# Patient Record
Sex: FEMALE | Race: WHITE | NOT HISPANIC OR LATINO | Employment: OTHER | URBAN - METROPOLITAN AREA
[De-identification: names, ages, dates, MRNs, and addresses within clinical notes are randomized per-mention and may not be internally consistent; named-entity substitution may affect disease eponyms.]

---

## 2024-06-16 ENCOUNTER — HOSPITAL ENCOUNTER (EMERGENCY)
Facility: MEDICAL CENTER | Age: 54
End: 2024-06-17
Attending: EMERGENCY MEDICINE
Payer: MEDICARE

## 2024-06-16 DIAGNOSIS — M25.552 LEFT HIP PAIN: ICD-10-CM

## 2024-06-16 DIAGNOSIS — R45.851 SUICIDAL IDEATION: ICD-10-CM

## 2024-06-16 DIAGNOSIS — Z59.00 HOMELESSNESS: ICD-10-CM

## 2024-06-16 DIAGNOSIS — F19.90 DRUG USE: ICD-10-CM

## 2024-06-16 LAB — POC BREATHALIZER: 0 PERCENT (ref 0–0.01)

## 2024-06-16 PROCEDURE — 302970 POC BREATHALIZER

## 2024-06-16 PROCEDURE — 99285 EMERGENCY DEPT VISIT HI MDM: CPT

## 2024-06-16 SDOH — ECONOMIC STABILITY - HOUSING INSECURITY: HOMELESSNESS UNSPECIFIED: Z59.00

## 2024-06-17 ENCOUNTER — APPOINTMENT (OUTPATIENT)
Dept: RADIOLOGY | Facility: MEDICAL CENTER | Age: 54
End: 2024-06-17
Attending: EMERGENCY MEDICINE
Payer: MEDICARE

## 2024-06-17 VITALS
HEIGHT: 68 IN | TEMPERATURE: 97.2 F | SYSTOLIC BLOOD PRESSURE: 149 MMHG | DIASTOLIC BLOOD PRESSURE: 88 MMHG | BODY MASS INDEX: 27.93 KG/M2 | RESPIRATION RATE: 19 BRPM | HEART RATE: 94 BPM | OXYGEN SATURATION: 96 % | WEIGHT: 184.3 LBS

## 2024-06-17 PROCEDURE — 700102 HCHG RX REV CODE 250 W/ 637 OVERRIDE(OP): Performed by: EMERGENCY MEDICINE

## 2024-06-17 PROCEDURE — 90791 PSYCH DIAGNOSTIC EVALUATION: CPT

## 2024-06-17 PROCEDURE — A9270 NON-COVERED ITEM OR SERVICE: HCPCS | Performed by: EMERGENCY MEDICINE

## 2024-06-17 PROCEDURE — 73502 X-RAY EXAM HIP UNI 2-3 VIEWS: CPT | Mod: LT

## 2024-06-17 RX ORDER — ACETAMINOPHEN 500 MG
1000 TABLET ORAL ONCE
Status: COMPLETED | OUTPATIENT
Start: 2024-06-17 | End: 2024-06-17

## 2024-06-17 RX ADMIN — ACETAMINOPHEN 1000 MG: 500 TABLET, FILM COATED ORAL at 07:05

## 2024-06-17 NOTE — ED NOTES
Pt taken back to [place] [address] via wheelchair by this RN. Report given to Primary Nurse, Warren BRAVO. Pt in view of RN station and 1:1 sitter.

## 2024-06-17 NOTE — ED TRIAGE NOTES
Chief Complaint   Patient presents with    Hip Pain     Chronic left hip pain that is worse since pt fell onto left side 3 weeks ago; pt seen at [employer] after fall    Suicidal Ideation     Pt endorses SI s/t the pain with plan to OD on Fentanyl     Pt brought in by EMS and to triage via wheelchair for above complaint. Pt states that she just moved here from [place] in Feb and has not gotten established with a PCM. Pt states that she was previously with a pain management clinic in [place] for chronic pain to her hip and back but has been unable to get in with clinic here in [place]. Pt denies hx of suicide attempts in the past but states that she feels suicidal d/t this pain and being homeless. Pt does endorse smoking marijuana and meth to deal with these problems. Last meth use earlier today but pt is calm and cooperative in triage.     Pt is alert/oriented and follows commands. Pt speaking in full sentences and responds appropriately to questions. No acute distress noted in triage and respirations are even and unlabored.     Charge RN notified of pt and room requested.

## 2024-06-17 NOTE — ED NOTES
Checked on bed, with unlabored respirations. No safety risk noted  Sleeping  Continued safety precaution  Gurney in low position, side rail up for pt safety.   No needs identified at the moment

## 2024-06-17 NOTE — ED NOTES
Given PO fluids and snacks    Checked on bed, with unlabored respirations. No safety risk noted  Awake on bed  Continued safety precaution  Gurney in low position, side rail up for pt safety.   No needs identified at the moment

## 2024-06-17 NOTE — ED NOTES
Information was offered by Alert Team that this pt is going to [facility] later at around 0715 - 0730 AM,  and taxi voucher with bus pass at pt's chart.

## 2024-06-17 NOTE — ED NOTES
Bedside report received from previous shift.   Assumed patient care. Verified patient identification.  Checked on bed, with unlabored respirations. Discussed plan of care.   Vital signs is stable. Denied any new complaints.   Gurney in low position, side rail up for pt safety.  No needs identified at the moment.    Contraptions: None  Alert and Oriented: x 4  Ambulatory: Yes  Oxygen: None  Pending: UA/ on legal hold (not yet certified by ERP) - pending Alert Team evaluation

## 2024-06-17 NOTE — DISCHARGE PLANNING
[facility]  Crisis/Safety Plan    Name:  Sushma Farmer  MRN:  [id number]  Date:  2024    Warning signs that a crisis may be developing for me or I may be at risk:  1) tearful  2) fidgeting    Coping strategies I can use on my own (relaxation, physical activity, etc):  1) playing video games on my phone  2) deep breathing    Ways I can make my environment safe:  1) decrease drug use  2) secure sharps  3) no access to firearms    Things I want to tell myself when I feel a crisis developin) validate feelings  2) just breath      People I can contact for support or distraction (and their phone numbers):  1) boyfriend  2) son    If I’m not able to reach my support people, or the above strategies don’t help, I can contact the following professionals, agencies, or hotlines:  1) Crisis Call Center ():  [phone] -OR- [phone]  2) Crisis Text Line ():  Text CARE TO 446281       Diana Mcnally R.N.

## 2024-06-17 NOTE — ED NOTES
Pt ambulatory w/ steady gait. Pt assisted to ER lobby by staff. Cab called for pt to be transported to [facility]. Pt reminded to call for PCP for chronic issues. Pt verbalizes understanding for all instructions.

## 2024-06-17 NOTE — ED PROVIDER NOTES
"                                                        ED Provider Note    CHIEF COMPLAINT  Chief Complaint   Patient presents with    Hip Pain     Chronic left hip pain that is worse since pt fell onto left side 3 weeks ago; pt seen at [place] after fall    Suicidal Ideation     Pt endorses SI s/t the pain with plan to OD on Fentanyl        HPI    Primary care provider: No primary care provider on file.   History obtained from: Patient  History limited by: None     Sushma Farmer is a 54 y.o. female who presents to the ED by EMS complaining of suicidal ideation due to left hip pain.  Patient reports that she fell 3 weeks ago and hit her left hip on a \"big rock\" and has had pain since then.  She states that she was seen at [facility] \"last week\" and they did x-rays and \"they were mad at me.\"  She continues to have left hip pain which is making her want to kill herself.  She states that she plans to overdose on fentanyl.  She states that she does not have fentanyl \"but I know where to get it.\"  She denies any acts of self-harm.  She denies past suicide attempts.  She denies homicidal ideation.  She states that she does smoke fentanyl and methamphetamine at times and denies previous IV drug use.  She denies alcohol use.  She states that she came up from [place] in February because she  her  of 30 years.  She states that she is currently homeless although she has a son in the area.  She is requesting Seroquel to help her sleep tonight.  She also would like some food.  She otherwise has no further complaints.  She denies fever/recent illness/cough/shortness of breath or difficulty breathing/nausea/vomiting/diarrhea/dysuria.    REVIEW OF SYSTEMS  Please see HPI for pertinent positives/negatives.  All other systems reviewed and are negative.     PAST MEDICAL HISTORY  Past Medical History:   Diagnosis Date    Anxiety     Chronic pain     Depression     Diabetes (HCC)     Hypertension         SURGICAL " "HISTORY  Past Surgical History:   Procedure Laterality Date    HYSTERECTOMY LAPAROSCOPY      OTHER      stimulator placed in back    OTHER ORTHOPEDIC SURGERY Bilateral     knee replacements        SOCIAL HISTORY  Social History     Tobacco Use    Smoking status: Former     Types: Cigarettes    Smokeless tobacco: Never   Vaping Use    Vaping status: Every Day    Substances: THC   Substance and Sexual Activity    Alcohol use: Yes     Comment: \"couple times a year\"    Drug use: Yes     Types: Inhaled     Comment: marijuana and meth    Sexual activity: Not on file        FAMILY HISTORY  History reviewed. No pertinent family history.     CURRENT MEDICATIONS  Home Medications       Reviewed by Mera Go R.N. (Registered Nurse) on 06/16/24 at 2320  Med List Status: Not Addressed     Medication Last Dose Status        Patient Darshan Taking any Medications                            ALLERGIES  Allergies   Allergen Reactions    Zantac [Ranitidine] Anaphylaxis        PHYSICAL EXAM  VITAL SIGNS: BP (!) 149/88   Pulse 94   Temp 36.2 °C (97.2 °F) (Temporal)   Resp 19   Ht 1.727 m (5' 8\")   Wt 83.6 kg (184 lb 4.9 oz)   SpO2 96%   BMI 28.02 kg/m²  @GÓMEZ[[id number]::@     Pulse ox interpretation: 96% I interpret this pulse ox as normal     Constitutional: Well developed, well nourished, alert in no apparent distress, nontoxic appearance    HENT: No external signs of trauma, normocephalic, oropharynx moist and clear   Eyes: PERRL, EOMI, conjunctiva without erythema, no discharge, no icterus    Neck: Soft and supple, trachea midline, no stridor, no tenderness, no LAD, good ROM    Cardiovascular: Regular rate and rhythm, no murmurs/rubs/gallops, strong distal pulses and good perfusion    Thorax & Lungs: No respiratory distress, CTAB    Abdomen: Soft, nontender, nondistended, no guarding, no rebound, normal BS    Back: No CVAT     Extremities: No cyanosis, mild tenderness left hip laterally, no edema/bruising/crepitus, no gross " deformity, good ROM, intact distal pulses with brisk cap refill    Skin: Warm, dry, no pallor/cyanosis, no rash noted      Neuro: A/O times 3, no focal deficits noted    Psychiatric: Cooperative, normal mood and affect, no signs of hallucinations or delusions, reports suicidal ideation with plan, denies homicidal ideation      DIAGNOSTIC STUDIES / PROCEDURES        LABS  All labs reviewed by me.     Results for orders placed or performed during the hospital encounter of 06/16/24   POC BREATHALIZER   Result Value Ref Range    POC Breathalizer 0.00 0.00 - 0.01 Percent        RADIOLOGY  I have independently interpreted the diagnostic imaging associated with this visit and am waiting the final reading from the radiologist.   My preliminary interpretation is as follows: No displaced fracture/dislocation.    DX-HIP-COMPLETE - UNILATERAL 2+ LEFT   Final Result         1.  No radiographic evidence of acute traumatic injury.             COURSE & MEDICAL DECISION MAKING  Nursing notes, VS, PMSFHx reviewed in chart.     Review of past medical records shows the patient was seen at [facility] ED on June 14, 2024 and diagnosed with left thigh pain.  Patient was last seen in the office at [facility] on January 7, 2022.      Differential diagnoses considered include but are not limited to: Fx, contusion, strain, sprain, bursitis, suicidal ideation/attempt, anxiety, depression, personality disorder, intoxication      ED Observation Status? Yes; I am placing the patient in to an observation status due to a diagnostic uncertainty as well as therapeutic intensity. Patient placed in observation status at 11:52 PM, 6/16/2024.     Observation plan is as follows: We will check alcohol level and UDS and also obtain left hip x-ray and monitor patient in the ED.    Upon Reevaluation, the patient's condition has: Remained stable and will be discharged.    Patient discharged from ED Observation status at 0730 on June  17, 2024.       INITIAL ASSESSMENT AND PLAN  Care Narrative: This is a 54-year-old female patient with medical history including diabetes, hypertension, anxiety, depression who presents by EMS complaining of suicidal ideation with plan to overdose on fentanyl.  Patient reports suicidal ideation because of continued left hip pain after she fell and hit her hip on a rock 3 weeks ago.  She reports being seen at [facility] with negative x-ray but continues to have pain.  Will obtain x-rays of the left hip today and also check UDS and alcohol level and closely monitor patient in the ED while awaiting mental health evaluation.      Discussion of management with other QHP or appropriate source(s): Behavioral Health        Escalation of care considered, and ultimately not performed: acute inpatient care management, however at this time, the patient is most appropriate for outpatient management.     Barriers to care at this time, including but not limited to: Patient does not have established PCP and Patient is homeless.     Decision tools and prescription drugs considered including, but not limited to: Medication modification   .       History and physical exam as above.  Patient with negative alcohol level and admits to using meth and fentanyl as needed for her chronic pain.  Patient was evaluated by alert team and cleared for discharge to [facility] and outpatient follow-up.  Her suicidal ideation appears to be situational and due to combination of her chronic pain and her current unsheltered social situation.  X-rays of the left hip obtained in the ED without acute bony findings and patient was able to ambulate in the ED without difficulty.  I have low clinical suspicion at this time for occult fracture or septic joint.  Patient's exam is otherwise benign.  She is cooperative and without evidence for hallucinations or delusions.  She does not appear to be clinically intoxicated.  Patient was monitored in the ED and  remained clinically stable.  She is aware of plan for discharge to [facility] and is agreeable.  She was given outpatient resources and advised on outpatient follow-up.  She was noted to have incidental elevated blood pressure for which she can follow-up on outpatient basis for further management.  Return to ED precautions given.  She verbalized understanding and agreed with plan of care with no further questions or concerns.      The patient is referred to a primary physician for blood pressure management, diabetic screening, and for all other preventative health concerns.       FINAL IMPRESSION  1. Suicidal ideation Acute   2. Left hip pain Acute   3. Homelessness Active   4. Drug use Active          DISPOSITION  Patient will be [facility] in [facility] condition.       FOLLOW UP  [facility] - Primary Care and Family Medicine  [address] [address]  [phone]  Schedule an appointment as soon as possible for a visit       [facility] - Primary Care  [address] [facility] [address]  [phone]  Schedule an appointment as soon as possible for a visit       Please go to [facility] now as discussed with behavioral health          [facility], Emergency Dept  [address] [address] [phone]    If symptoms worsen         OUTPATIENT MEDICATIONS  There are no discharge medications for this patient.         Electronically signed by: Elvin Sesay D.O., 6/16/2024 11:52 PM      Portions of this record were made with voice recognition software.  Despite my review, errors may remain.  Please interpret this chart in the appropriate context.

## 2024-06-17 NOTE — CONSULTS
"[facility]   TRIAGE ASSESSMENT    Name: Sushma Farmer  MRN: [id number]  : 1970  Age: 54 y.o.  Date of assessment: 2024  PCP: Pcp Pt States None  Persons in attendance: Patient  Patient Location: [facility]    CHIEF COMPLAINT/PRESENTING ISSUE (as stated by Patient, ER RN, ANCELMO):   Chief Complaint   Patient presents with    Hip Pain     Chronic left hip pain that is worse since pt fell onto left side 3 weeks ago; pt seen at [facility] after fall    Suicidal Ideation     Pt endorses SI s/t the pain with plan to OD on Fentanyl      Patient is a 53 y/o female presenting to ER with complaints of increasing hip pain and passive suicidal ideation.   Patient initially voices plan to over dose on street fentanyl but denies intent or no access to any illicit drug other than methamphetamine. Patient admits to recent meth use, 2 hrs prior to ER arrival; \"I haven't slept for a week,\" d/t active substance use to stay awake when sleeping on the streets.   Patient denies any history of previous attempts or self-harming behaviors.  Patient seeking assistance connecting to services in the community since relocating from [facility] since February. Patient reports a psychiatric diagnose history of Depression, Insomnia  and Anxiety; reports previously prescribed Seroquel and hydroxyzine.     CURRENT LIVING SITUATION/SOCIAL SUPPORT/FINANCIAL RESOURCES: Patient un housed since relocating from [place] in February following divorce of  of 30 yrs. Patient reports moving to be near son in [place].     BEHAVIORAL HEALTH/SUBSTANCE USE TREATMENT HISTORY  Does patient/parent report a history of prior behavioral health/substance use treatment for patient?   Diagnose history or Anxiety  and Depression;   Previously prescribed Seroquel and Sertraline; off meds 3 1/2 months.  Previously established with [facility].     SAFETY ASSESSMENT - SELF  Does patient acknowledge current or past symptoms of " dangerousness to self or is previous history noted? yes  Does parent/significant other report patient has current or past symptoms of dangerousness to self? N\A  Does presenting problem suggest symptoms of dangerousness to self? No; Patient presents to ER with hip pain vocalizing increasing depression and passive suicidal ideation with plan to overdose on fentanyl; patient denies any intent and SI situational to her chronic and pain and homelessness. Patient denies any hx of previous attempts or self-harming behaviors. Patient denies current SI; forward thinking and goal oriented; patient able to contract for safety; patient is forward thinking and goal oriented.     SAFETY ASSESSMENT - OTHERS  Does patient acknowledge current or past symptoms of aggressive behavior or risk to others or is previous history noted? no  Does parent/significant other report patient has current or past symptoms of aggressive behavior or risk to others?  N\A  Does presenting problem suggest symptoms of dangerousness to others? No; denies HI; calm and cooperative with ER staff.     LEGAL HISTORY  Does patient acknowledge history of arrest/care home/[facility] or is previous history noted? no    Crisis Safety Plan completed and copy given to patient? yes    ABUSE/NEGLECT SCREENING  Does patient report feeling “unsafe” in his/her home, or afraid of anyone?  no  Does patient report any history of physical, sexual, or emotional abuse?  no  Does parent or significant other report any of the above? N\A  Is there evidence of neglect by self?  no  Is there evidence of neglect by a caregiver? no  Does the patient/parent report any history of CPS/APS/police involvement related to suspected abuse/neglect or domestic violence? no  Based on the information provided during the current assessment, is a mandated report of suspected abuse/neglect being made?  No    SUBSTANCE USE SCREENING  Yes:  Owen all substances used in the past 30 days:      Last Use Amount  "  []   Alcohol     []   Marijuana     []   Heroin     []   Prescription Opioids  (used without prescription, for    recreation, or in excess of prescribed amount)     []   Other Prescription  (used without prescription, for    recreation, or in excess of prescribed amount)     []   Cocaine      [x]   Methamphetamine 6/16/24 Unknown amount   []   \"\" drugs (ectasy, MDMA)     []   Other substances        UDS results: pending  Breathalyzer results: 0.00    What consequences does the patient associate with any of the above substance use and or addictive behaviors? Monetary problems: patient reports homeless and substance use d/t not wanting to sleep at night for fear of being attacked.    Risk factors for detox (check all that apply):  []  Seizures   []  Diaphoretic (sweating)   []  Tremors   []  Hallucinations   []  Increased blood pressure   []  Decreased blood pressure   []  Other   [x]  None      [x] Patient education on risk factors for detoxification and instructed to return to ER as needed.      MENTAL STATUS   Participation: Active verbal participation and Open to feedback  Grooming: Casual  Orientation: Fully Oriented and Drowsy/Somnolent  Behavior: Calm  Eye contact: Limited  Mood: Depressed  Affect: Flexible and Full range  Thought process: Logical and Goal-directed  Thought content: Within normal limits  Speech: Rate within normal limits and Volume within normal limits  Perception: Within normal limits  Memory:  No gross evidence of memory deficits  Insight: Adequate  Judgment:  Adequate  Other:    Collateral information:   Source:  [] Significant other present in person:   [] Significant other by telephone  [] [facility]   [x] [facility] Staff  [x] [facility]  [x] Other: ERP    [] Unable to complete full assessment due to:  [] Acute intoxication  [] Patient declined to participate/engage  [] Patient verbally unresponsive  [] Significant cognitive deficits  [] Significant " perceptual distortions or behavioral disorganization  [x] Other: N/A     CLINICAL IMPRESSIONS:  Primary:  Depression  Secondary:  Homelessness, Chronic Pain, Substance Use       IDENTIFIED NEEDS/PLAN:  [Trigger DISPOSITION list for any items marked]    []  Imminent safety risk - self [] Imminent safety risk - others   []  Acute substance withdrawal []  Psychosis/Impaired reality testing   [x]  Mood/anxiety [x]  Substance use/Addictive behavior   []  Maladaptive behaviro []  Parent/child conflict   []  Family/Couples conflict [x]  Biomedical   [x]  Housing [x]  Financial   []   Legal  Occupational/Educational   []  Domestic violence []  Other:     Recommended Plan of Care:  Refer to [facility], [facility], [facility] [place], and [facility], [facility]  Taxi Voucher # [phone] upon discharge.    Has the Recommended Plan of Care/Level of Observation been reviewed with the patient's assigned nurse? yes    Does patient/parent or guardian express agreement with the above plan? yes    Referral appointment(s) scheduled? N\A    Alert team only: Patient reports SI is situational to her current un housed status resulting in her recent methamphetamine use, patient also reports increasing depression d/t her uncontrolled, chronic hip pain. Patient able to contract for safety, requesting to rest with something to eat. Patient to discharge over to [facility] in the morning to connect to shelter and transitional housing.   I have discussed findings and recommendations with Dr. Sesay who is in agreement with these recommendations.     Referral information sent to the following outpatient community providers : [facility]    Referral information sent to the following inpatient community providers : N/A      Diana Mcnally R.N.  6/17/2024

## 2024-06-17 NOTE — ED NOTES
ANCELMO discontinued the legal hold as per discussion wit the Alert Team  Pt will be discharged in the morning

## 2024-06-21 ENCOUNTER — PHARMACY VISIT (OUTPATIENT)
Dept: PHARMACY | Facility: MEDICAL CENTER | Age: 54
End: 2024-06-21
Payer: COMMERCIAL

## 2024-06-21 ENCOUNTER — HOSPITAL ENCOUNTER (EMERGENCY)
Facility: MEDICAL CENTER | Age: 54
End: 2024-06-21
Attending: EMERGENCY MEDICINE
Payer: MEDICARE

## 2024-06-21 VITALS
OXYGEN SATURATION: 98 % | HEIGHT: 68 IN | WEIGHT: 177 LBS | HEART RATE: 98 BPM | SYSTOLIC BLOOD PRESSURE: 130 MMHG | RESPIRATION RATE: 14 BRPM | DIASTOLIC BLOOD PRESSURE: 80 MMHG | TEMPERATURE: 97.6 F | BODY MASS INDEX: 26.83 KG/M2

## 2024-06-21 DIAGNOSIS — F15.10 METHAMPHETAMINE ABUSE (HCC): ICD-10-CM

## 2024-06-21 DIAGNOSIS — F43.21 SITUATIONAL DEPRESSION: ICD-10-CM

## 2024-06-21 DIAGNOSIS — Z59.00 HOMELESSNESS: ICD-10-CM

## 2024-06-21 DIAGNOSIS — M54.32 SCIATICA OF LEFT SIDE: ICD-10-CM

## 2024-06-21 LAB — POC BREATHALIZER: 0 PERCENT (ref 0–0.01)

## 2024-06-21 PROCEDURE — 302970 POC BREATHALIZER

## 2024-06-21 PROCEDURE — 302970 POC BREATHALIZER: Performed by: EMERGENCY MEDICINE

## 2024-06-21 PROCEDURE — A9270 NON-COVERED ITEM OR SERVICE: HCPCS | Performed by: EMERGENCY MEDICINE

## 2024-06-21 PROCEDURE — RXMED WILLOW AMBULATORY MEDICATION CHARGE: Performed by: EMERGENCY MEDICINE

## 2024-06-21 PROCEDURE — 700102 HCHG RX REV CODE 250 W/ 637 OVERRIDE(OP): Performed by: EMERGENCY MEDICINE

## 2024-06-21 PROCEDURE — 99284 EMERGENCY DEPT VISIT MOD MDM: CPT

## 2024-06-21 RX ORDER — OXYCODONE AND ACETAMINOPHEN 10; 325 MG/1; MG/1
1 TABLET ORAL ONCE
Status: COMPLETED | OUTPATIENT
Start: 2024-06-21 | End: 2024-06-21

## 2024-06-21 RX ORDER — IBUPROFEN 600 MG/1
600 TABLET ORAL ONCE
Status: COMPLETED | OUTPATIENT
Start: 2024-06-21 | End: 2024-06-21

## 2024-06-21 RX ORDER — METHYLPREDNISOLONE 4 MG/1
TABLET ORAL
Qty: 21 TABLET | Refills: 0 | Status: SHIPPED | OUTPATIENT
Start: 2024-06-21

## 2024-06-21 RX ADMIN — OXYCODONE HYDROCHLORIDE AND ACETAMINOPHEN 1 TABLET: 10; 325 TABLET ORAL at 10:48

## 2024-06-21 RX ADMIN — IBUPROFEN 600 MG: 600 TABLET, FILM COATED ORAL at 10:48

## 2024-06-21 SDOH — ECONOMIC STABILITY - HOUSING INSECURITY: HOMELESSNESS UNSPECIFIED: Z59.00

## 2024-06-21 NOTE — ED NOTES
"Pt discharged by self via buspass. The pt is alert and oriented, calm and cooperative, talks with clear coherent speech, ambulates with a steady gait with a walker, and moves extremities. The pt reports a resolution of pain upon discharge. Vitals stable. pt in possession of belongings. Pt provided discharge education and information pertaining to medications and follow up appointments. Pt received copy of discharge instructions and verbalized understanding. /80   Pulse 98   Temp 36.4 °C (97.6 °F) (Temporal)   Resp 14   Ht 1.727 m (5' 8\")   Wt 80.3 kg (177 lb)   SpO2 98%   BMI 26.91 kg/m²     "

## 2024-06-21 NOTE — ED NOTES
Bedside report received from off going RN/tech: Tatiana, assumed care of patient.  POC discussed with patient. Call light within reach, all needs addressed at this time.       Fall risk interventions in place: Move the patient closer to the nurse's station, Patient's personal possessions are with in their safe reach, Place socks on patient, Place fall risk sign on patient's door, Keep floor surfaces clean and dry, and Accompanied to restroom (all applicable per [facility] Fall risk assessment)   Continuous monitoring: Not Applicable   IVF/IV medications: Not Applicable   Oxygen: Room Air  Bedside sitter: Not Applicable   Isolation: Not Applicable

## 2024-06-21 NOTE — PROGRESS NOTES
Cane fit to pt and left at bedside. Pt instructed on use and adjustments if necessary after dc. All relevant questions answered at this time.    Contact traction for any questions or concerns regarding this DME.

## 2024-06-21 NOTE — ED TRIAGE NOTES
Chief Complaint   Patient presents with    Suicidal Ideation     SHIRA CANTU from clinic off [address]. Patient was there for her sciatica pain. She states that the pain is making her feel suicidal. She has a plan to overdose on fentanyl. Patient states that if she was not in pain she would not want to hurt herself. Reports pain has been there for 4 weeks. Last used meth this am. Also states she is out of her bipolar/depression medication

## 2024-06-21 NOTE — ED NOTES
Pt finished meal, pt changing for discharge. The pt is alert and oriented. The pt denies pain while sitting

## 2024-06-21 NOTE — ED PROVIDER NOTES
ER Provider Note    Scribed for Paco Jenkins M.D. by Mirlande Cabral. 6/21/2024   10:32 AM    Primary Care Provider: None noted.    CHIEF COMPLAINT  Chief Complaint   Patient presents with    Suicidal Ideation     SHIRA CANTU from clinic off [address]. Patient was there for her sciatica pain. She states that the pain is making her feel suicidal. She has a plan to overdose on fentanyl. Patient states that if she was not in pain she would not want to hurt herself. Reports pain has been there for 4 weeks. Last used meth this am. Also states she is out of her bipolar/depression medication     EXTERNAL RECORDS REVIEWED  Patient was in the ED 5 days ago 6/16/24 for similar complaints of hip pain and suicidal ideation.     HPI/ROS  Sushma Farmer is a 54 y.o. female who presents to the ED for evaluation of chronic left sided sciatica, worsening pain today. Patient reports she had pain management in [place] for 13 years, where oxycodone or percocet controlled her pain. She reports that she has not taken her pain medications in a while. The patient states she is not suicidal if her pain is under control. She denies any bowel or bladder incontinence.    PAST MEDICAL HISTORY  Past Medical History:   Diagnosis Date    Anxiety     Chronic pain     Depression     Diabetes (HCC)     Hypertension        SURGICAL HISTORY  Past Surgical History:   Procedure Laterality Date    HYSTERECTOMY LAPAROSCOPY      OTHER      stimulator placed in back    OTHER ORTHOPEDIC SURGERY Bilateral     knee replacements       FAMILY HISTORY  History reviewed. No pertinent family history.    SOCIAL HISTORY   reports that she has quit smoking. Her smoking use included cigarettes. She has never used smokeless tobacco. She reports current alcohol use. She reports current drug use. Drug: Inhaled.    CURRENT MEDICATIONS  Previous Medications    No medications on file       ALLERGIES  Allergies   Allergen Reactions    Zantac [Ranitidine] Anaphylaxis        PHYSICAL  "EXAM  BP (!) 154/102   Pulse (!) 55   Temp 36.8 °C (98.2 °F)   Resp 16   Ht 1.727 m (5' 8\")   Wt 80.3 kg (177 lb)   SpO2 98%   BMI 26.91 kg/m²      Nursing note and vitals reviewed.  Constitutional: Well-developed and well-nourished. No distress.   HENT: Head is normocephalic and atraumatic. Oropharynx is clear and moist without exudate or erythema.   Eyes: Pupils are equal, round, and reactive to light. Conjunctiva are normal.   Cardiovascular: Normal rate and regular rhythm. No murmur heard. Normal radial pulses.  Pulmonary/Chest: Breath sounds normal. No wheezes or rales.   Abdominal: Soft and non-tender. No distention    Musculoskeletal: Extremities exhibit normal range of motion without edema or tenderness.   Neurological: Awake, alert and oriented to person, place, and time. No focal deficits noted.  Skin: Skin is warm and dry. No rash.   Psychiatric: Depressed mood. Flat affect. Situational attitude related to pain.    DIAGNOSTIC STUDIES    Labs:   Results for orders placed or performed during the hospital encounter of 06/21/24   POC BREATHALIZER   Result Value Ref Range    POC Breathalizer 0.000 0.00 - 0.01 Percent            INITIAL ASSESSMENT AND PLAN    10:32 AM - Patient was evaluated at bedside for left-sided sciatic pain. Patient feels that she would not be suicidal if pain was under control. Ordered for POC beathalizer, and Urine drug screen to evaluate. The patient will be medicated with Motrin 500 mg and Percocet  mg for her symptoms. Patient verbalizes understanding and support with my plan of care. I referred the patient to [facility] to have further treatment of their pain. She will be discharged with a Medrol dose pack.            DISPOSITION AND DISCUSSIONS    Escalation of care considered, and ultimately not performed: acute inpatient care management, however at this time, the patient is most appropriate for outpatient management.    Barriers to care at this time, " including but not limited to: Patient does not have established PCP.     Decision tools and prescription drugs considered including, but not limited to: Pain Medications. I considered prescribing narcotic pain medication, however I feel pain should be adequately controlled with over the counter NSAIDs.       DISPOSITION:  Patient will be discharged home in stable condition.    FOLLOW UP:  [facility], Emergency Dept  [address]  [place] [id number] [phone]    If symptoms worsen    [facility]  [address] [place] [id number]  [phone]    Today        OUTPATIENT MEDICATIONS:  New Prescriptions    METHYLPREDNISOLONE (MEDROL) 4 MG TAB    Take as per the package instructions.         FINAL DIAGNOSIS  1. Situational depression    2. Sciatica of left side    3. Methamphetamine abuse (HCC)    4. Homelessness         Mirlande CHANDLER (Scribe), am scribing for, and in the presence of, Paco Jenkins M.D..    Electronically signed by: Mirlande Cabral (Scribe), 6/21/2024    Paco CHANDLER M.D. personally performed the services described in this documentation, as scribed by Mirlande Cabral in my presence, and it is both accurate and complete.      The note accurately reflects work and decisions made by me.  Paco Jenkins M.D.  6/21/2024  1:11 PM

## 2024-08-16 ENCOUNTER — HOSPITAL ENCOUNTER (EMERGENCY)
Facility: MEDICAL CENTER | Age: 54
End: 2024-08-17
Attending: EMERGENCY MEDICINE
Payer: MEDICARE

## 2024-08-16 ENCOUNTER — APPOINTMENT (OUTPATIENT)
Dept: RADIOLOGY | Facility: MEDICAL CENTER | Age: 54
End: 2024-08-16
Attending: EMERGENCY MEDICINE
Payer: MEDICARE

## 2024-08-16 VITALS
HEART RATE: 102 BPM | SYSTOLIC BLOOD PRESSURE: 178 MMHG | WEIGHT: 178.79 LBS | DIASTOLIC BLOOD PRESSURE: 101 MMHG | TEMPERATURE: 97.9 F | HEIGHT: 68 IN | RESPIRATION RATE: 19 BRPM | OXYGEN SATURATION: 98 % | BODY MASS INDEX: 27.1 KG/M2

## 2024-08-16 DIAGNOSIS — S70.02XA TRAUMATIC HEMATOMA OF LEFT HIP, INITIAL ENCOUNTER: ICD-10-CM

## 2024-08-16 DIAGNOSIS — M25.552 LEFT HIP PAIN: ICD-10-CM

## 2024-08-16 DIAGNOSIS — R29.6 MULTIPLE FALLS: ICD-10-CM

## 2024-08-16 PROCEDURE — 73501 X-RAY EXAM HIP UNI 1 VIEW: CPT | Mod: LT

## 2024-08-16 PROCEDURE — 99284 EMERGENCY DEPT VISIT MOD MDM: CPT

## 2024-08-17 ENCOUNTER — PHARMACY VISIT (OUTPATIENT)
Dept: PHARMACY | Facility: MEDICAL CENTER | Age: 54
End: 2024-08-17
Payer: COMMERCIAL

## 2024-08-17 PROCEDURE — A9270 NON-COVERED ITEM OR SERVICE: HCPCS | Performed by: EMERGENCY MEDICINE

## 2024-08-17 PROCEDURE — RXMED WILLOW AMBULATORY MEDICATION CHARGE: Performed by: EMERGENCY MEDICINE

## 2024-08-17 PROCEDURE — 700102 HCHG RX REV CODE 250 W/ 637 OVERRIDE(OP): Performed by: EMERGENCY MEDICINE

## 2024-08-17 RX ORDER — HYDROCODONE BITARTRATE AND ACETAMINOPHEN 5; 325 MG/1; MG/1
1 TABLET ORAL EVERY 6 HOURS PRN
Qty: 8 TABLET | Refills: 0 | Status: SHIPPED | OUTPATIENT
Start: 2024-08-17 | End: 2024-08-19

## 2024-08-17 RX ORDER — OXYCODONE AND ACETAMINOPHEN 5; 325 MG/1; MG/1
1 TABLET ORAL ONCE
Status: COMPLETED | OUTPATIENT
Start: 2024-08-17 | End: 2024-08-17

## 2024-08-17 RX ADMIN — OXYCODONE HYDROCHLORIDE AND ACETAMINOPHEN 1 TABLET: 5; 325 TABLET ORAL at 01:04

## 2024-08-17 NOTE — ED TRIAGE NOTES
"Chief Complaint   Patient presents with    Hip Pain     Worsening left hip pain today. Pt reports pt fell on left side this morning. - head strike. Ps 10/10. Pt able to bear weight.        Pt ambulatory to triage. Pt A&Ox4, Pt reports she took robaxin and naproxen with no relief..     Pt to lobby . Pt educated on alerting staff in changes to condition. Pt verbalized understanding.      BP (!) 208/108   Pulse (!) 118   Temp 36.3 °C (97.3 °F) (Temporal)   Resp 20   Ht 1.727 m (5' 8\")   Wt 81.1 kg (178 lb 12.7 oz)   SpO2 98%   BMI 27.19 kg/m²    "

## 2024-08-17 NOTE — ED NOTES
"Bedside report received from off going RN/tech: Iam, assumed care of patient.  POC discussed with patient. Call light within reach, all needs addressed at this time.       Fall risk interventions in place: Not Applicable (all applicable per [place] Fall risk assessment)   Continuous monitoring: Not Applicable   IVF/IV medications: Not Applicable   Oxygen: Room Air  Bedside sitter: Not Applicable   Isolation: Not Applicable    BP (!) 178/101   Pulse (!) 102   Temp 36.6 °C (97.9 °F) (Temporal)   Resp 19   Ht 1.727 m (5' 8\")   Wt 81.1 kg (178 lb 12.7 oz)   SpO2 98%  - RA  "

## 2024-08-17 NOTE — DISCHARGE INSTRUCTIONS
Apply warm compresses to the hip  Take the medication and prescribing you as directed and follow-up with [facility].  Your x-ray was negative and the lump on your hip is due to a collection of blood from all of your falls.

## 2024-08-17 NOTE — ED NOTES
Patient provided with walker per request. Patient demonstrated understanding or adjusting height and walked independently with steady gait using walker. Discharge education provided. Discharge paperwork reviewed with pt. Prescription to be picked up by pt. All questions answered. All belongings with pt. Pt ambulated to lobby unassisted with steady gait using walker.

## 2024-08-17 NOTE — ED PROVIDER NOTES
ER Provider Note    Scribed for Dr. Nani Hensley D.O. by Cadence Patiño. 8/17/2024  12:19 AM    Primary Care Provider: Pcp Pt States None    CHIEF COMPLAINT  Chief Complaint   Patient presents with    Hip Pain     Worsening left hip pain today. Pt reports pt fell on left side this morning. - head strike. Ps 10/10. Pt able to bear weight.        EXTERNAL RECORDS REVIEWED  External ED Note Patient reports seeing [facility] 8/3/2024 for left hip pain and low back pain.    HPI/ROS  LIMITATION TO HISTORY   Select: : None    OUTSIDE HISTORIAN(S):  Friend present at bedside.    Sushma Farmer is a 54 y.o. female who presents to the ED for worsening left hip pain onset today. Patient reports that she is homeless currently. She reports falling eight times today onto her left hip. 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 She reports falling on her left side this morning. She denies hitting her head. She reports the pain as a 10/10 in severity. She reports being able to bear weight. She denies allergies to medicine. There are no known alleviating or exacerbating factors.  She reports also being seen at [facility] where she was prescribed Naprosyn and Robaxin.     PAST MEDICAL HISTORY  Past Medical History:   Diagnosis Date    Anxiety     Chronic pain     Depression     Diabetes (HCC)     Hypertension        SURGICAL HISTORY  Past Surgical History:   Procedure Laterality Date    HYSTERECTOMY LAPAROSCOPY      OTHER      stimulator placed in back    OTHER ORTHOPEDIC SURGERY Bilateral     knee replacements       FAMILY HISTORY  History reviewed. No pertinent family history.    SOCIAL HISTORY   reports that she has quit smoking. Her smoking use included cigarettes. She has never used smokeless tobacco. She reports current alcohol use. She reports current drug use. Drug: Inhaled.    CURRENT MEDICATIONS  Previous Medications    METHYLPREDNISOLONE (MEDROL) 4 MG TABLET THERAPY PACK     "Take as per the package instructions.       ALLERGIES  Zantac [ranitidine]    PHYSICAL EXAM  BP (!) 178/101   Pulse (!) 102   Temp 36.6 °C (97.9 °F) (Temporal)   Resp 19   Ht 1.727 m (5' 8\")   Wt 81.1 kg (178 lb 12.7 oz)   SpO2 98%   BMI 27.19 kg/m²   Constitutional: Patient is well developed, well nourished. Non-toxic appearing. Moderate distress, crying hysterically.  HENT: Normocephalic, atraumatic.  Nares are patent and clear, oral mucosa is moist.  Cardiovascular: Normal heart rate and Regular rhythm. No murmur,   Thorax & Lungs: Clear and equal breath sounds with good excursion. No respiratory distress   Abdomen: Bowel sounds normal in all four quadrants. Soft,nontender, no rebound , guarding, palpable masses.   Skin: Warm, Dry,   Left hip with 6 x 6 cm hematoma, with ecchymotic changes discoloration consistent with one week.  Extremities: Peripheral pulses 4/4 no peripheral edema.  Neurovascularly intact.  Neurologic: Alert & oriented x 3, Normal motor function, Normal sensory function,   Psychiatric: Affect odd, mood is tearful and agitated    DIAGNOSTIC STUDIES & PROCEDURES    Radiology:   The attending Emergency Physician has independently interpreted the diagnostic imaging associated with this visit and is awaiting the final reading from the radiologist, which will be displayed below.    Preliminary interpretation is a follows: No acute abnormalities    Radiologist interpretation:    DX-HIP-UNILATERAL-WITH PELVIS-1 VIEW LEFT   Final Result         1.  No radiographic evidence of acute traumatic injury.           COURSE & MEDICAL DECISION MAKING    ED Observation Status? No; Patient does not meet criteria for ED Observation.     INITIAL ASSESSMENT AND PLAN  Care Narrative:       11:07 PM - ordered DX-Hip for further evaluation.    12:19 AM - Patient seen and evaluated at bedside. Discussed plan of care, including discharge, because upon my exam and imaging results there is no evidence of " abnormality. Agreed on plan to medicate for pain prior to discharge. Patient agrees to plan of care. Patient will be treated with Percocet for her symptoms.     12:52 AM - The patient informs me their pain feels improved following medication administration. I discussed the patient's diagnostic study results which show no acute abnormality. Noted the patient's exam and vitals at this time are reassuring. Discussed plan for discharge; I advised the patient to return to the [facility] ED with any new or worsening symptoms. Patient was given the opportunity to ask any questions. I addressed all questions or concerns and the patient is stable for discharge at this time. Patient verbalizes understanding and agreement to this plan of care.             ADDITIONAL PROBLEM LIST AND DISPOSITION  Homelessness, chronic narcotic dependence               DISPOSITION AND DISCUSSIONS  I have discussed management of the patient with the following physicians and MAGUE's: None    Discussion of management with other QHP or appropriate source(s): None     Escalation of care considered, and ultimately not performed: acute inpatient care management, however at this time, the patient is most appropriate for outpatient management.    Barriers to care at this time, including but not limited to: Patient does not have established PCP.     Decision tools and prescription drugs considered including, but not limited to: Pain Medications Tylenol and Motrin .    The patient will return for new or worsening symptoms and is stable at the time of discharge.    The patient is referred to a primary physician for blood pressure management, diabetic screening, and for all other preventative health concerns.    In prescribing controlled substances to this patient, I certify that I have obtained and reviewed the medical history of Sushma Farmer. I have also made a good ana effort to obtain applicable records from other providers who have treated the patient and  records did not demonstrate any increased risk of substance abuse that would prevent me from prescribing controlled substances.     I have conducted a physical exam and documented it. I have reviewed Ms. Farmer’s prescription history as maintained by the [place] Prescription Monitoring Program.     I have assessed the patient’s risk for abuse, dependency, and addiction using the validated Opioid Risk Tool available at https://www.mdcalc.com/dfurdj-xifd-pygc-ort-narcotic-abuse.     Given the above, I believe the benefits of controlled substance therapy outweigh the risks. The reasons for prescribing controlled substances include non-narcotic, oral analgesic alternatives have been inadequate for pain control. Accordingly, I have discussed the risk and benefits, treatment plan, and alternative therapies with the patient.     DISPOSITION:  Patient will be discharged home in stable condition.    FOLLOW UP:  [address] [address]  [phone]  Schedule an appointment as soon as possible for a visit in 3 days  For wound re-check      OUTPATIENT MEDICATIONS:  New Prescriptions    HYDROCODONE-ACETAMINOPHEN (NORCO) 5-325 MG TAB PER TABLET    Take 1 Tablet by mouth every 6 hours as needed (Severe pain) for up to 2 days. Take with food and stool softeners      FINAL IMPRESSION   1. Multiple falls    2. Left hip pain    3. Traumatic hematoma of left hip, initial encounter         Cadence CHANDLER (Rolly), am scribing for, and in the presence of, Nani Hensley D.O..    Electronically signed by: Cadence Patiño (Rolly), 8/17/2024    Nani CHANDLER D.O. personally performed the services described in this documentation, as scribed by Cadence Patiño in my presence, and it is both accurate and complete.    The note accurately reflects work and decisions made by me.  Nani Hensley D.O.  8/17/2024  3:48 AM

## 2024-09-22 ENCOUNTER — HOSPITAL ENCOUNTER (EMERGENCY)
Facility: MEDICAL CENTER | Age: 54
End: 2024-09-22
Attending: EMERGENCY MEDICINE

## 2024-09-22 ENCOUNTER — PHARMACY VISIT (OUTPATIENT)
Dept: PHARMACY | Facility: MEDICAL CENTER | Age: 54
End: 2024-09-22
Payer: COMMERCIAL

## 2024-09-22 ENCOUNTER — APPOINTMENT (OUTPATIENT)
Dept: RADIOLOGY | Facility: MEDICAL CENTER | Age: 54
End: 2024-09-22
Attending: EMERGENCY MEDICINE

## 2024-09-22 VITALS
HEIGHT: 70 IN | BODY MASS INDEX: 25.56 KG/M2 | TEMPERATURE: 98.2 F | OXYGEN SATURATION: 93 % | DIASTOLIC BLOOD PRESSURE: 72 MMHG | HEART RATE: 101 BPM | RESPIRATION RATE: 16 BRPM | SYSTOLIC BLOOD PRESSURE: 125 MMHG | WEIGHT: 178.57 LBS

## 2024-09-22 DIAGNOSIS — K43.2 INCISIONAL HERNIA, WITHOUT OBSTRUCTION OR GANGRENE: ICD-10-CM

## 2024-09-22 DIAGNOSIS — R10.9 FLANK PAIN: ICD-10-CM

## 2024-09-22 DIAGNOSIS — N39.0 ACUTE UTI: ICD-10-CM

## 2024-09-22 LAB
ALBUMIN SERPL BCP-MCNC: 3.8 G/DL (ref 3.2–4.9)
ALBUMIN/GLOB SERPL: 1.1 G/DL
ALP SERPL-CCNC: 84 U/L (ref 30–99)
ALT SERPL-CCNC: 16 U/L (ref 2–50)
ANION GAP SERPL CALC-SCNC: 16 MMOL/L (ref 7–16)
APPEARANCE UR: CLEAR
AST SERPL-CCNC: 23 U/L (ref 12–45)
BACTERIA #/AREA URNS HPF: ABNORMAL /HPF
BASOPHILS # BLD AUTO: 0.3 % (ref 0–1.8)
BASOPHILS # BLD: 0.04 K/UL (ref 0–0.12)
BILIRUB SERPL-MCNC: 0.3 MG/DL (ref 0.1–1.5)
BILIRUB UR QL STRIP.AUTO: NEGATIVE
BUN SERPL-MCNC: 9 MG/DL (ref 8–22)
CALCIUM ALBUM COR SERPL-MCNC: 9.5 MG/DL (ref 8.5–10.5)
CALCIUM SERPL-MCNC: 9.3 MG/DL (ref 8.5–10.5)
CHLORIDE SERPL-SCNC: 98 MMOL/L (ref 96–112)
CO2 SERPL-SCNC: 18 MMOL/L (ref 20–33)
COLOR UR: YELLOW
CREAT SERPL-MCNC: 0.74 MG/DL (ref 0.5–1.4)
EOSINOPHIL # BLD AUTO: 0 K/UL (ref 0–0.51)
EOSINOPHIL NFR BLD: 0 % (ref 0–6.9)
EPI CELLS #/AREA URNS HPF: NEGATIVE /HPF
ERYTHROCYTE [DISTWIDTH] IN BLOOD BY AUTOMATED COUNT: 41.7 FL (ref 35.9–50)
GFR SERPLBLD CREATININE-BSD FMLA CKD-EPI: 94 ML/MIN/1.73 M 2
GLOBULIN SER CALC-MCNC: 3.6 G/DL (ref 1.9–3.5)
GLUCOSE SERPL-MCNC: 162 MG/DL (ref 65–99)
GLUCOSE UR STRIP.AUTO-MCNC: >=1000 MG/DL
HCT VFR BLD AUTO: 39.9 % (ref 37–47)
HGB BLD-MCNC: 13.5 G/DL (ref 12–16)
HYALINE CASTS #/AREA URNS LPF: ABNORMAL /LPF
IMM GRANULOCYTES # BLD AUTO: 0.09 K/UL (ref 0–0.11)
IMM GRANULOCYTES NFR BLD AUTO: 0.8 % (ref 0–0.9)
KETONES UR STRIP.AUTO-MCNC: NEGATIVE MG/DL
LEUKOCYTE ESTERASE UR QL STRIP.AUTO: NEGATIVE
LIPASE SERPL-CCNC: 34 U/L (ref 11–82)
LYMPHOCYTES # BLD AUTO: 0.73 K/UL (ref 1–4.8)
LYMPHOCYTES NFR BLD: 6.2 % (ref 22–41)
MCH RBC QN AUTO: 29.6 PG (ref 27–33)
MCHC RBC AUTO-ENTMCNC: 33.8 G/DL (ref 32.2–35.5)
MCV RBC AUTO: 87.5 FL (ref 81.4–97.8)
MICRO URNS: ABNORMAL
MONOCYTES # BLD AUTO: 0.43 K/UL (ref 0–0.85)
MONOCYTES NFR BLD AUTO: 3.7 % (ref 0–13.4)
NEUTROPHILS # BLD AUTO: 10.45 K/UL (ref 1.82–7.42)
NEUTROPHILS NFR BLD: 89 % (ref 44–72)
NITRITE UR QL STRIP.AUTO: POSITIVE
NRBC # BLD AUTO: 0 K/UL
NRBC BLD-RTO: 0 /100 WBC (ref 0–0.2)
PH UR STRIP.AUTO: 5.5 [PH] (ref 5–8)
PLATELET # BLD AUTO: 199 K/UL (ref 164–446)
PMV BLD AUTO: 10.2 FL (ref 9–12.9)
POTASSIUM SERPL-SCNC: 3.4 MMOL/L (ref 3.6–5.5)
PROT SERPL-MCNC: 7.4 G/DL (ref 6–8.2)
PROT UR QL STRIP: NEGATIVE MG/DL
RBC # BLD AUTO: 4.56 M/UL (ref 4.2–5.4)
RBC # URNS HPF: ABNORMAL /HPF
RBC UR QL AUTO: NEGATIVE
SODIUM SERPL-SCNC: 132 MMOL/L (ref 135–145)
SP GR UR STRIP.AUTO: 1.02
UROBILINOGEN UR STRIP.AUTO-MCNC: 0.2 MG/DL
WBC # BLD AUTO: 11.7 K/UL (ref 4.8–10.8)
WBC #/AREA URNS HPF: ABNORMAL /HPF

## 2024-09-22 PROCEDURE — 36415 COLL VENOUS BLD VENIPUNCTURE: CPT

## 2024-09-22 PROCEDURE — RXMED WILLOW AMBULATORY MEDICATION CHARGE: Performed by: EMERGENCY MEDICINE

## 2024-09-22 PROCEDURE — 74176 CT ABD & PELVIS W/O CONTRAST: CPT

## 2024-09-22 PROCEDURE — 99284 EMERGENCY DEPT VISIT MOD MDM: CPT

## 2024-09-22 PROCEDURE — 700102 HCHG RX REV CODE 250 W/ 637 OVERRIDE(OP): Performed by: EMERGENCY MEDICINE

## 2024-09-22 PROCEDURE — 80053 COMPREHEN METABOLIC PANEL: CPT

## 2024-09-22 PROCEDURE — 85025 COMPLETE CBC W/AUTO DIFF WBC: CPT

## 2024-09-22 PROCEDURE — A9270 NON-COVERED ITEM OR SERVICE: HCPCS | Performed by: EMERGENCY MEDICINE

## 2024-09-22 PROCEDURE — 83690 ASSAY OF LIPASE: CPT

## 2024-09-22 PROCEDURE — 700105 HCHG RX REV CODE 258: Performed by: EMERGENCY MEDICINE

## 2024-09-22 PROCEDURE — 96375 TX/PRO/DX INJ NEW DRUG ADDON: CPT

## 2024-09-22 PROCEDURE — 700111 HCHG RX REV CODE 636 W/ 250 OVERRIDE (IP): Mod: JZ | Performed by: EMERGENCY MEDICINE

## 2024-09-22 PROCEDURE — 81001 URINALYSIS AUTO W/SCOPE: CPT

## 2024-09-22 PROCEDURE — 96374 THER/PROPH/DIAG INJ IV PUSH: CPT

## 2024-09-22 RX ORDER — SODIUM CHLORIDE, SODIUM LACTATE, POTASSIUM CHLORIDE, CALCIUM CHLORIDE 600; 310; 30; 20 MG/100ML; MG/100ML; MG/100ML; MG/100ML
1000 INJECTION, SOLUTION INTRAVENOUS ONCE
Status: COMPLETED | OUTPATIENT
Start: 2024-09-22 | End: 2024-09-22

## 2024-09-22 RX ORDER — HYDROCODONE BITARTRATE AND ACETAMINOPHEN 5; 325 MG/1; MG/1
1 TABLET ORAL ONCE
Status: COMPLETED | OUTPATIENT
Start: 2024-09-22 | End: 2024-09-22

## 2024-09-22 RX ORDER — KETOROLAC TROMETHAMINE 15 MG/ML
15 INJECTION, SOLUTION INTRAMUSCULAR; INTRAVENOUS ONCE
Status: COMPLETED | OUTPATIENT
Start: 2024-09-22 | End: 2024-09-22

## 2024-09-22 RX ORDER — SULFAMETHOXAZOLE AND TRIMETHOPRIM 800; 160 MG/1; MG/1
1 TABLET ORAL 2 TIMES DAILY
Qty: 14 TABLET | Refills: 0 | Status: ACTIVE | OUTPATIENT
Start: 2024-09-22 | End: 2024-09-29

## 2024-09-22 RX ORDER — IBUPROFEN 600 MG/1
600 TABLET, FILM COATED ORAL EVERY 6 HOURS PRN
Qty: 20 TABLET | Refills: 0 | Status: SHIPPED | OUTPATIENT
Start: 2024-09-22 | End: 2024-09-27

## 2024-09-22 RX ORDER — CEFTRIAXONE 1 G/1
1000 INJECTION, POWDER, FOR SOLUTION INTRAMUSCULAR; INTRAVENOUS ONCE
Status: COMPLETED | OUTPATIENT
Start: 2024-09-22 | End: 2024-09-22

## 2024-09-22 RX ADMIN — CEFTRIAXONE SODIUM 1000 MG: 1 INJECTION, POWDER, FOR SOLUTION INTRAMUSCULAR; INTRAVENOUS at 11:53

## 2024-09-22 RX ADMIN — SODIUM CHLORIDE, POTASSIUM CHLORIDE, SODIUM LACTATE AND CALCIUM CHLORIDE 1000 ML: 600; 310; 30; 20 INJECTION, SOLUTION INTRAVENOUS at 10:02

## 2024-09-22 RX ADMIN — HYDROCODONE BITARTRATE AND ACETAMINOPHEN 1 TABLET: 5; 325 TABLET ORAL at 11:30

## 2024-09-22 RX ADMIN — KETOROLAC TROMETHAMINE 15 MG: 15 INJECTION, SOLUTION INTRAMUSCULAR; INTRAVENOUS at 06:53

## 2024-09-22 RX ADMIN — SODIUM CHLORIDE, POTASSIUM CHLORIDE, SODIUM LACTATE AND CALCIUM CHLORIDE 1000 ML: 600; 310; 30; 20 INJECTION, SOLUTION INTRAVENOUS at 06:54

## 2024-09-22 ASSESSMENT — PAIN DESCRIPTION - PAIN TYPE
TYPE: ACUTE PAIN
TYPE: ACUTE PAIN

## 2024-09-22 NOTE — ED NOTES
Bedside report received from prior RN. Pt alert. Resp normal/unlabored on RA. Bed side rails up/in low position.    Pt to CT

## 2024-09-22 NOTE — PROGRESS NOTES
"30-45\" abdominal binder sent to tube station 94    Contact traction for any questions or concerns.   "

## 2024-09-22 NOTE — ED NOTES
Assumed care of patient, patient bedside report given from to RN  . Pt AAO X 4 , respirations even and unlabored, on room air . Call light in reach, Fall risk interventions in place.

## 2024-09-22 NOTE — ED NOTES
ABD binder applied, education given. Pt ambulated to bathroom, no balance issues observed, successful and appropriate walking mobility. UA obtained and sent.

## 2024-09-22 NOTE — ED TRIAGE NOTES
"54 y.o. female Sushma Farmer    Chief Complaint   Patient presents with    Flank Pain     Protrusion/mass on left flank area.      BIB EMS to [facility]   [address]icked up from [address]   EMS called by friend    Patient presented to ED with complaint of left flank pain, mass/ protrusion is present on left flank patient referring it as hernia, patient endorses she has spinal stimulator in place and had multiple lumber surgeries. Patient reporting pain 10/10,  AAO X4 , Respirations even and unlabored on room air , afebrile at this time. Unable to obtained any further history because patient is being in severe pain.     Medications given en route:   Morphine 4 mg IV     /73   Pulse (!) 106   Temp 36.8 °C (98.2 °F) (Temporal)   Resp (!) 22   Ht 1.768 m (5' 9.6\")   Wt 81 kg (178 lb 9.2 oz)   SpO2 92%   BMI 25.92 kg/m²     Past Medical History:   Diagnosis Date    Anxiety     Chronic pain     Depression     Diabetes (HCC)     Hypertension      Past Surgical History:   Procedure Laterality Date    HYSTERECTOMY LAPAROSCOPY      OTHER      stimulator placed in back    OTHER ORTHOPEDIC SURGERY Bilateral     knee replacements         "

## 2024-09-22 NOTE — ED PROVIDER NOTES
"ED Provider Note    CHIEF COMPLAINT  Chief Complaint   Patient presents with    Flank Pain     Protrusion/mass on left flank area.        EXTERNAL RECORDS REVIEWED  External ED Note from [facility] August 2024 patient presented with left-sided hip pain and frequent falls, had unremarkable imaging and was prescribed Naprosyn and Robaxin as well as lidocaine patch, she additionally had a visit here on August 16 for similar    HPI/ROS  LIMITATION TO HISTORY   Select: : None  OUTSIDE HISTORIAN(S):  none    Sushma Farmer is a 54 y.o. female who presents with left-sided flank pain.  Patient reports that about 30 minutes prior to arrival she had abrupt onset of the pain.  It is sharp, constant, does not seem to have any alleviating or aggravating factors.  No radiation.  She reports no other abdominal pain, no chest pain or shortness of breath, no dysuria or hematuria.  No fevers or chills.  No focal weakness or numbness.  No nausea or vomiting or diarrhea.    PAST MEDICAL HISTORY   has a past medical history of Anxiety, Chronic pain, Depression, Diabetes (HCC), and Hypertension.    SURGICAL HISTORY   has a past surgical history that includes hysterectomy laparoscopy; other orthopedic surgery (Bilateral); and other.    FAMILY HISTORY  No family history on file.    SOCIAL HISTORY  Social History     Tobacco Use    Smoking status: Former     Types: Cigarettes    Smokeless tobacco: Never   Vaping Use    Vaping status: Every Day    Substances: THC   Substance and Sexual Activity    Alcohol use: Yes     Comment: \"couple times a year\"    Drug use: Yes     Types: Inhaled     Comment: marijuana and meth    Sexual activity: Not on file       CURRENT MEDICATIONS  Home Medications    **Home medications have not yet been reviewed for this encounter**       Audit from Redirected Encounters    **Home medications have not yet been reviewed for this encounter**         ALLERGIES  Allergies   Allergen Reactions    Zantac [Ranitidine] " "Anaphylaxis       PHYSICAL EXAM  VITAL SIGNS: /72   Pulse (!) 101   Temp 36.8 °C (98.2 °F) (Temporal)   Resp 16   Ht 1.768 m (5' 9.6\")   Wt 81 kg (178 lb 9.2 oz)   SpO2 93%   BMI 25.92 kg/m²      Pulse ox interpretation: I interpret this pulse ox as normal.  Constitutional: Alert uncomfortable  HENT: No signs of trauma, Bilateral external ears normal, Nose normal.   Eyes: Pupils are equal and reactive, Conjunctiva normal, Non-icteric.   Neck: Normal range of motion, No tenderness, Supple, No stridor.   Cardiovascular: Regular rate and rhythm, no murmurs.   Thorax & Lungs: Normal breath sounds, No respiratory distress, No wheezing, No chest tenderness.   Abdomen: Bowel sounds normal, Soft, No tenderness, No masses, No pulsatile masses. No peritoneal signs.  Over the left flank there is approximately a 6 cm hernia, incisional,  noted without any tenderness or erythema, patient reports this has been there for many years without changes  Skin: Warm, Dry, No erythema, No rash.   Back: No bony tenderness, No CVA tenderness.   Extremities: Intact distal pulses, No edema, No tenderness, No cyanosis,  Negative Gracie's sign.   Musculoskeletal: No tenderness to palpation or major deformities noted.   Neurologic: Alert , Normal motor function, Normal sensory function, No focal deficits noted.   Psychiatric: Affect normal, Judgment normal, Mood normal.               EKG/LABS  Labs Reviewed   CBC WITH DIFFERENTIAL - Abnormal; Notable for the following components:       Result Value    WBC 11.7 (*)     Neutrophils-Polys 89.00 (*)     Lymphocytes 6.20 (*)     Neutrophils (Absolute) 10.45 (*)     Lymphs (Absolute) 0.73 (*)     All other components within normal limits   COMP METABOLIC PANEL - Abnormal; Notable for the following components:    Sodium 132 (*)     Potassium 3.4 (*)     Co2 18 (*)     Glucose 162 (*)     Globulin 3.6 (*)     All other components within normal limits   URINALYSIS - Abnormal; Notable for the " following components:    Glucose >=1000 (*)     Nitrite Positive (*)     All other components within normal limits   URINE MICROSCOPIC (W/UA) - Abnormal; Notable for the following components:    Bacteria Many (*)     All other components within normal limits   LIPASE   ESTIMATED GFR       I have independently interpreted this EKG    RADIOLOGY/PROCEDURES   I have independently interpreted the diagnostic imaging associated with this visit and am waiting the final reading from the radiologist.   My preliminary interpretation is as follows: no obstruction    Radiologist interpretation:  CT-RENAL COLIC EVALUATION(A/P W/O)   Final Result         1. Patchy bilateral, left greater right, lower lobe airspace infiltrates with bronchial wall thickening. Correlate for aspiration or pneumonia.   2. 8 cm bowel containing left posterolateral abdominal wall hernia, as above.   3. Colonic diverticulosis.   4. No renal stone or hydronephrosis. No acute intra-abdominal process is seen.          COURSE & MEDICAL DECISION MAKING    ASSESSMENT, COURSE AND PLAN  Care Narrative: 6:37 AM  Patient is evaluated at the bedside and chart is reviewed, at this point differential includes ureteral stone, consideration for electrolyte or metabolic derangement, renal insufficiency, urinary tract infection, I have ordered for Toradol, diagnostic labs      8:23 AM  Patient is reevaluated, she is much more comfortable, states pain is gone currently.  Pending urinalysis    10:08 AM  Patient is reevaluated, she is sleeping comfortably, still pending urinalysis    1145 AM  Patient is reevaluated, she is comfortable, discussed all results and she is comfortable with discharge          PROBLEMS MANAGED  # Left-sided flank pain.  Now resolved.  At this point potentially multifactorial.  CT shows no evidence of obstruction, perforation or obstructive ureteral stone.  She does have a hernia that area but no tenderness or other findings on exam to suggest  incarceration or strangulation and her pain has resolved.  No significant electrolyte or metabolic derangements.  She does have some bacteria and nitrite in her urine although no fevers so potential for early pyelonephritis and she was treated as such    # Urinary tract infection.  As above, given Rocephin here, home on Bactrim per protocol    # Incisional hernia.  In review of her records this has been longstanding for years and she has been seen in [place] for this.  She reports no acute changes while she did have some pain over that area this has resolved and there is no finding of incarceration or strangulation.  She is given a binder and referred to general surgery for outpatient follow-up    DISPOSITION AND DISCUSSIONS    Barriers to care at this time, including but not limited to: Patient does not have established PCP.     Decision tools and prescription drugs considered including, but not limited to: Antibiotics new prescriptions as below .    The patient will return for new or worsening symptoms and is stable at the time of discharge.    The patient is referred to a primary physician for blood pressure management, diabetic screening, and for all other preventative health concerns.        DISPOSITION:  Patient will be discharged home in stable condition.    FOLLOW UP:  Amanda Bella M.D.  [address] [place] [address] [id number] [phone]    Schedule an appointment as soon as possible for a visit       [address] [address]  [phone]    As needed      OUTPATIENT MEDICATIONS:  New Prescriptions    IBUPROFEN (MOTRIN) 600 MG TAB    Take 1 Tablet by mouth every 6 hours as needed for Moderate Pain for up to 5 days.    SULFAMETHOXAZOLE-TRIMETHOPRIM (BACTRIM DS) 800-160 MG TABLET    Take 1 Tablet by mouth 2 times a day for 7 days.         FINAL DIAGNOSIS  1. Flank pain    2. Acute UTI    3. Incisional hernia, without obstruction or gangrene         Electronically  signed by: Aston Orellana M.D., 9/22/2024 6:37 AM

## 2024-09-22 NOTE — ED NOTES
Pt discharged, all appropriate hospital equipment removed (IV, monitor, pulse ox, etc.). Pt left unit via wc to lobby for p/u via son. Personal belongings with pt when leaving unit. Pt given discharge instructions prior to leaving ER, including where to  prescriptions and when to follow-up if applicable; verbalizes understanding. Pt informed to return to ED if symptoms worsen/return or altered status develop. Copy of discharge instructions signed and turned into DC basket and copy sent with pt. F/u with gen surg and PCP